# Patient Record
Sex: FEMALE | Race: BLACK OR AFRICAN AMERICAN | NOT HISPANIC OR LATINO | Employment: FULL TIME | RURAL
[De-identification: names, ages, dates, MRNs, and addresses within clinical notes are randomized per-mention and may not be internally consistent; named-entity substitution may affect disease eponyms.]

---
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
## 2022-08-02 ENCOUNTER — TELEPHONE (OUTPATIENT)
Dept: FAMILY MEDICINE CLINIC | Facility: CLINIC | Age: 22
End: 2022-08-02

## 2022-08-02 NOTE — TELEPHONE ENCOUNTER
Called and spoke with patient and her mother passed away. I made her an appointment for 08/09/2022. She verbalized understanding

## 2022-08-08 ENCOUNTER — TELEPHONE (OUTPATIENT)
Dept: FAMILY MEDICINE CLINIC | Facility: CLINIC | Age: 22
End: 2022-08-08

## 2022-08-08 NOTE — TELEPHONE ENCOUNTER
I have tried to call her to discuss her request for the rx olanzapine. I am showing that she is on the fluoxetine but the other was discontinued by Dr. Masters. If she wants back on the rx olanzapine she will need an appt.   There is no way to leave her a voice mail to call us back so I am hoping she will see that I have called and return my call. TF

## 2022-08-08 NOTE — TELEPHONE ENCOUNTER
PT CAME IN THINKING HER APPOINTMENT WAS TODAY RATHER THAN TOMORROW. SHE WANTED TO LEAVE A MESSAGE, SHE IS REQUESTING TO GO BACK ON OLANZAPINE AND FLUOXETINE.

## 2022-08-09 ENCOUNTER — OFFICE VISIT (OUTPATIENT)
Dept: FAMILY MEDICINE CLINIC | Facility: CLINIC | Age: 22
End: 2022-08-09

## 2022-08-09 VITALS
DIASTOLIC BLOOD PRESSURE: 77 MMHG | HEART RATE: 74 BPM | HEIGHT: 68 IN | RESPIRATION RATE: 12 BRPM | OXYGEN SATURATION: 98 % | SYSTOLIC BLOOD PRESSURE: 109 MMHG | BODY MASS INDEX: 27.34 KG/M2 | WEIGHT: 180.4 LBS | TEMPERATURE: 98.4 F

## 2022-08-09 DIAGNOSIS — F31.81 BIPOLAR 2 DISORDER, MAJOR DEPRESSIVE EPISODE: ICD-10-CM

## 2022-08-09 DIAGNOSIS — F33.1 MAJOR DEPRESSIVE DISORDER, RECURRENT EPISODE, MODERATE: Primary | ICD-10-CM

## 2022-08-09 PROBLEM — Z30.017 ENCOUNTER FOR INITIAL PRESCRIPTION OF IMPLANTABLE SUBDERMAL CONTRACEPTIVE: Status: ACTIVE | Noted: 2021-08-04

## 2022-08-09 PROBLEM — F41.1 GENERALIZED ANXIETY DISORDER: Status: ACTIVE | Noted: 2022-08-09

## 2022-08-09 PROBLEM — K51.90 ULCERATIVE COLITIS (HCC): Status: ACTIVE | Noted: 2020-09-23

## 2022-08-09 PROBLEM — Z30.431 IUD CHECK UP: Status: ACTIVE | Noted: 2021-08-04

## 2022-08-09 PROCEDURE — 99213 OFFICE O/P EST LOW 20 MIN: CPT | Performed by: INTERNAL MEDICINE

## 2022-08-09 RX ORDER — ARIPIPRAZOLE 5 MG/1
5 TABLET ORAL DAILY
Qty: 30 TABLET | Refills: 2 | Status: SHIPPED | OUTPATIENT
Start: 2022-08-09 | End: 2022-09-13

## 2022-08-09 RX ORDER — FLUOXETINE 20 MG/1
20 TABLET, FILM COATED ORAL DAILY
COMMUNITY
Start: 2022-06-21

## 2022-08-09 NOTE — PROGRESS NOTES
Follow Up Office Visit      Date: 2022   Patient Name: Cheyanne Vernon  : 2000   MRN: [id number]     Chief Complaint:    Chief Complaint   Patient presents with   • Anxiety   • Depression       History of Present Illness: Cheyanne Vernon is a 22 y.o. female who is here today for review of her moods.  History of some anxiety depression and bipolar features in the past.  She had been on an atypical antipsychotic but stopped this continue only fluoxetine alone.  She is noted for last month having some more depressive symptoms with decreased energy, difficulty with sleep, feeling down.  She also has thought at times about suicide but is never really come up with a plan and is not currently suicidal at this point.  She does also have a lot of irritability and anger.  The symptoms have been exacerbated by the sudden death of her of her mother about a week ago.  She was drinking alcohol excessively last week but realizes that this is not appropriate and is now stopped.  Normally she might drink alcohol twice a week though still excessively.  No illicit drug use.  Does have ulcerative colitis which is currently in remission having had a good bill of health at her checkup with GI earlier today..    Subjective      Review of Systems:   Review of Systems    I have reviewed the patients family history, social history, past medical history, past surgical history and have updated it as appropriate.     Medications:     Current Outpatient Medications:   •  ARIPiprazole (Abilify) 5 MG tablet, Take 1 tablet by mouth Daily., Disp: 30 tablet, Rfl: 2  •  FLUoxetine (PROzac) 20 MG tablet, Take 20 mg by mouth Daily., Disp: , Rfl:   •  mesalamine (APRISO) 0.375 g 24 hr capsule, Take 1,500 mg by mouth Daily., Disp: , Rfl:   •  Vedolizumab (ENTYVIO IV), Infuse  into a venous catheter., Disp: , Rfl:   •  vitamin C (ASCORBIC ACID) 250 MG tablet, Take 250 mg by mouth Daily., Disp: , Rfl:     Allergies:   No Known  "Allergies    Objective     Physical Exam: Please see above  Vital Signs:   Vitals:    08/09/22 0858   BP: 109/77   Pulse: 74   Resp: 12   Temp: 98.4 °F (36.9 °C)   SpO2: 98%   Weight: 81.8 kg (180 lb 6.4 oz)   Height: 171.5 cm (67.5\")     Body mass index is 27.84 kg/m².         Physical Exam  General: Pleasant minimally overweight healthy-appearing young black female who is in no acute distress.  Affect down but insight still intact.  Fair to good eye contact.  Neck supple with no masses  Lungs clear with no tachypnea or cough  Cardiac regular rate rhythm with no murmurs gallops or rubs  Neurological exam grossly normal  Procedures    Results:   Labs:   No results found for: HGBA1C, CMP, CBCDIFFPANEL, CREAT, TSH     Imaging:   No valid procedures specified.     Assessment / Plan      Assessment/Plan:   Diagnoses and all orders for this visit:    1. Major depressive disorder, recurrent episode, moderate (HCC) (Primary)  Present for the last month on her fluoxetine 20 mg daily, exacerbated by the unexpected death of her mother last week.  She does have also some bipolar type features as well which are exacerbated, previously having been on an atypical antipsychotic.  We will initiate Abilify 5 mg nightly continue her fluoxetine.  I also recommended counseling which she declines at this stage.  She is not suicidal, and promises she would seek help if this became an issue.  We will reassess clinical response in 1 months.  2. Bipolar 2 disorder, major depressive episode (HCC)  Treat as above.  Other orders  -     ARIPiprazole (Abilify) 5 MG tablet; Take 1 tablet by mouth Daily.  Dispense: 30 tablet; Refill: 2        Follow Up:   Return in about 1 month (around 9/9/2022) for Recheck.      At [facility], we believe that sharing information builds trust and better relationships. You are receiving this note because you recently visited [facility]. It is possible you will see health information before a provider has " talked with you about it. This kind of information can be easy to misunderstand. To help you fully understand what it means for your health, we urge you to discuss this note with your provider.    Guerrero Elliott MD  [facility] [place]

## 2022-09-13 ENCOUNTER — OFFICE VISIT (OUTPATIENT)
Dept: FAMILY MEDICINE CLINIC | Facility: CLINIC | Age: 22
End: 2022-09-13

## 2022-09-13 VITALS
OXYGEN SATURATION: 100 % | DIASTOLIC BLOOD PRESSURE: 76 MMHG | BODY MASS INDEX: 27.34 KG/M2 | SYSTOLIC BLOOD PRESSURE: 118 MMHG | HEART RATE: 65 BPM | HEIGHT: 68 IN | TEMPERATURE: 98.8 F | WEIGHT: 180.4 LBS | RESPIRATION RATE: 12 BRPM

## 2022-09-13 DIAGNOSIS — F33.1 MAJOR DEPRESSIVE DISORDER, RECURRENT EPISODE, MODERATE: Primary | ICD-10-CM

## 2022-09-13 DIAGNOSIS — Z23 NEED FOR PROPHYLACTIC VACCINATION AND INOCULATION AGAINST INFLUENZA: ICD-10-CM

## 2022-09-13 PROCEDURE — 90471 IMMUNIZATION ADMIN: CPT | Performed by: INTERNAL MEDICINE

## 2022-09-13 PROCEDURE — 90688 IIV4 VACCINE SPLT 0.5 ML IM: CPT | Performed by: INTERNAL MEDICINE

## 2022-09-13 PROCEDURE — 99213 OFFICE O/P EST LOW 20 MIN: CPT | Performed by: INTERNAL MEDICINE

## 2022-09-13 RX ORDER — ARIPIPRAZOLE 5 MG/1
TABLET ORAL
Qty: 45 TABLET | Refills: 1 | Status: SHIPPED | OUTPATIENT
Start: 2022-09-13

## 2022-09-13 NOTE — PROGRESS NOTES
"    Follow Up Office Visit      Date: 2022   Patient Name: Cheyanne Vernon  : 2000   MRN: [id number]     Chief Complaint:    Chief Complaint   Patient presents with   • Follow-up       History of Present Illness: Cheyanne Vernon is a 22 y.o. female who is here today for follow-up of her bipolar disorder with some depression, having been started on Abilify 5 mg nightly 1 month ago continue her fluoxetine 20 mg daily.  She does feel she still having some irritability and anger, having some fatigue, but not frankly depressed or anxious at this time.  Overall may be a little bit better from last visit but not markedly so.  No other acute problems at this time.  Interested in a flu vaccine..    Subjective      Review of Systems:   Review of Systems    I have reviewed the patients family history, social history, past medical history, past surgical history and have updated it as appropriate.     Medications:     Current Outpatient Medications:   •  FLUoxetine (PROzac) 20 MG tablet, Take 20 mg by mouth Daily., Disp: , Rfl:   •  mesalamine (APRISO) 0.375 g 24 hr capsule, Take 1,500 mg by mouth Daily., Disp: , Rfl:   •  Vedolizumab (ENTYVIO IV), Infuse  into a venous catheter., Disp: , Rfl:   •  vitamin C (ASCORBIC ACID) 250 MG tablet, Take 250 mg by mouth Daily., Disp: , Rfl:   •  ARIPiprazole (Abilify) 5 MG tablet, 1.5 tablets at bedtime, Disp: 45 tablet, Rfl: 1    Allergies:   No Known Allergies    Objective     Physical Exam: Please see above  Vital Signs:   Vitals:    22 0853   BP: 118/76   BP Location: Left arm   Patient Position: Sitting   Cuff Size: Adult   Pulse: 65   Resp: 12   Temp: 98.8 °F (37.1 °C)   TempSrc: Temporal   SpO2: 100%   Weight: 81.8 kg (180 lb 6.4 oz)   Height: 171.5 cm (67.5\")     Body mass index is 27.84 kg/m².  BMI is >= 25 and <30. (Overweight) The following options were offered after discussion;: weight loss educational material (shared in after visit summary), " exercise counseling/recommendations and nutrition counseling/recommendations       Physical Exam  General: Pleasant healthy-appearing 22-year-old female in no acute distress.  Affect is appropriate.  Smiling.  Well-groomed.  Insight intact.  Does not appear to be irritable.  Lungs clear  Cardiac regular rate rhythm with no murmurs gallops or rubs  Neurological exam grossly normal  Procedures    Results:   Labs:   No results found for: HGBA1C, CMP, CBCDIFFPANEL, CREAT, TSH     POCT Results (if applicable):   Results for orders placed or performed in visit on 08/03/22    COLONOSCOPY   Result Value Ref Range     Colonoscopy SEE REPORT         Imaging:   No valid procedures specified.   Mood disorder questionnaire for bipolar disorder +3 of 13 items, technically normal study  DARRICK-7 screening for anxiety score 3, normal  PHQ-9 screen for depression score of 9 consistent with mild dysthymia    Assessment / Plan      Assessment/Plan:   Diagnoses and all orders for this visit:    1. Major depressive disorder, recurrent episode, moderate (HCC) (Primary)  -     ARIPiprazole (Abilify) 5 MG tablet; 1.5 tablets at bedtime  Dispense: 45 tablet; Refill: 1  Overall having a very minimal improvement in her symptoms on low-dose Abilify 5 mg nightly.  Increase to 1.5 tablets or 7.5 mg nightly continuing fluoxetine 20 mg daily.  Side effect profile potential discussed.  Reassess clinically in 1 month and make further recommendations  2. Need for prophylactic vaccination and inoculation against influenza  -     Fluzone Split Quad (Multi-dose)  Influenza vaccine given today.  Strongly advise she obtain COVID-19 vaccine series given high risk on immunosuppressants from her Crohn's disease.      Follow Up:   Return in about 1 month (around 10/13/2022) for Recheck.      At [facility], we believe that sharing information builds trust and better relationships. You are receiving this note because you recently visited [facility]. It  is possible you will see health information before a provider has talked with you about it. This kind of information can be easy to misunderstand. To help you fully understand what it means for your health, we urge you to discuss this note with your provider.    Guerrero Elliott MD  [facility] [place]

## 2022-09-24 ENCOUNTER — HOSPITAL ENCOUNTER (EMERGENCY)
Facility: HOSPITAL | Age: 22
Discharge: HOME | End: 2022-09-24
Attending: EMERGENCY MEDICINE | Admitting: EMERGENCY MEDICINE

## 2022-09-24 VITALS
WEIGHT: 180 LBS | HEART RATE: 75 BPM | OXYGEN SATURATION: 95 % | TEMPERATURE: 98.2 F | DIASTOLIC BLOOD PRESSURE: 75 MMHG | HEIGHT: 68 IN | BODY MASS INDEX: 27.28 KG/M2 | SYSTOLIC BLOOD PRESSURE: 106 MMHG | RESPIRATION RATE: 15 BRPM

## 2022-09-24 DIAGNOSIS — Z20.2 EXPOSURE TO STD: ICD-10-CM

## 2022-09-24 DIAGNOSIS — D72.829 LEUKOCYTOSIS, UNSPECIFIED TYPE: ICD-10-CM

## 2022-09-24 DIAGNOSIS — N92.1 MENORRHAGIA WITH IRREGULAR CYCLE: Primary | ICD-10-CM

## 2022-09-24 LAB
ALBUMIN SERPL-MCNC: 3.7 G/DL (ref 3.5–5.2)
ALBUMIN/GLOB SERPL: 1.3 G/DL
ALP SERPL-CCNC: 68 U/L (ref 39–117)
ALT SERPL W P-5'-P-CCNC: 15 U/L (ref 1–33)
ANION GAP SERPL CALCULATED.3IONS-SCNC: 10 MMOL/L (ref 5–15)
AST SERPL-CCNC: 15 U/L (ref 1–32)
B-HCG UR QL: NEGATIVE
BACTERIA UR QL AUTO: ABNORMAL /HPF
BASOPHILS # BLD AUTO: 0.04 10*3/MM3 (ref 0–0.2)
BASOPHILS NFR BLD AUTO: 0.2 % (ref 0–1.5)
BILIRUB SERPL-MCNC: 0.2 MG/DL (ref 0–1.2)
BILIRUB UR QL STRIP: NEGATIVE
BUN SERPL-MCNC: 11 MG/DL (ref 6–20)
BUN/CREAT SERPL: 16.7 (ref 7–25)
CALCIUM SPEC-SCNC: 9 MG/DL (ref 8.6–10.5)
CHLORIDE SERPL-SCNC: 108 MMOL/L (ref 98–107)
CLARITY UR: CLEAR
CO2 SERPL-SCNC: 22 MMOL/L (ref 22–29)
COLOR UR: YELLOW
CREAT SERPL-MCNC: 0.66 MG/DL (ref 0.57–1)
DEPRECATED RDW RBC AUTO: 46.1 FL (ref 37–54)
EGFRCR SERPLBLD CKD-EPI 2021: 129 ML/MIN/1.73
EOSINOPHIL # BLD AUTO: 0.35 10*3/MM3 (ref 0–0.4)
EOSINOPHIL NFR BLD AUTO: 2 % (ref 0.3–6.2)
ERYTHROCYTE [DISTWIDTH] IN BLOOD BY AUTOMATED COUNT: 13.5 % (ref 12.3–15.4)
EXPIRATION DATE: NORMAL
GLOBULIN UR ELPH-MCNC: 2.8 GM/DL
GLUCOSE SERPL-MCNC: 94 MG/DL (ref 65–99)
GLUCOSE UR STRIP-MCNC: NEGATIVE MG/DL
HCT VFR BLD AUTO: 36.3 % (ref 34–46.6)
HGB BLD-MCNC: 11.9 G/DL (ref 12–15.9)
HGB UR QL STRIP.AUTO: ABNORMAL
HYALINE CASTS UR QL AUTO: ABNORMAL /LPF
IMM GRANULOCYTES # BLD AUTO: 0.06 10*3/MM3 (ref 0–0.05)
IMM GRANULOCYTES NFR BLD AUTO: 0.3 % (ref 0–0.5)
INTERNAL NEGATIVE CONTROL: NEGATIVE
INTERNAL POSITIVE CONTROL: POSITIVE
KETONES UR QL STRIP: NEGATIVE
LEUKOCYTE ESTERASE UR QL STRIP.AUTO: ABNORMAL
LYMPHOCYTES # BLD AUTO: 3.91 10*3/MM3 (ref 0.7–3.1)
LYMPHOCYTES NFR BLD AUTO: 22.2 % (ref 19.6–45.3)
Lab: NORMAL
MCH RBC QN AUTO: 30.1 PG (ref 26.6–33)
MCHC RBC AUTO-ENTMCNC: 32.8 G/DL (ref 31.5–35.7)
MCV RBC AUTO: 91.9 FL (ref 79–97)
MONOCYTES # BLD AUTO: 1.09 10*3/MM3 (ref 0.1–0.9)
MONOCYTES NFR BLD AUTO: 6.2 % (ref 5–12)
NEUTROPHILS NFR BLD AUTO: 12.16 10*3/MM3 (ref 1.7–7)
NEUTROPHILS NFR BLD AUTO: 69.1 % (ref 42.7–76)
NITRITE UR QL STRIP: NEGATIVE
NRBC BLD AUTO-RTO: 0 /100 WBC (ref 0–0.2)
PH UR STRIP.AUTO: 6.5 [PH] (ref 5–8)
PLATELET # BLD AUTO: 292 10*3/MM3 (ref 140–450)
PMV BLD AUTO: 10.2 FL (ref 6–12)
POTASSIUM SERPL-SCNC: 4.2 MMOL/L (ref 3.5–5.2)
PROT SERPL-MCNC: 6.5 G/DL (ref 6–8.5)
PROT UR QL STRIP: NEGATIVE
RBC # BLD AUTO: 3.95 10*6/MM3 (ref 3.77–5.28)
RBC # UR STRIP: ABNORMAL /HPF
REF LAB TEST METHOD: ABNORMAL
SODIUM SERPL-SCNC: 140 MMOL/L (ref 136–145)
SP GR UR STRIP: 1.02 (ref 1–1.03)
SQUAMOUS #/AREA URNS HPF: ABNORMAL /HPF
UROBILINOGEN UR QL STRIP: ABNORMAL
WBC # UR STRIP: ABNORMAL /HPF
WBC NRBC COR # BLD: 17.61 10*3/MM3 (ref 3.4–10.8)

## 2022-09-24 PROCEDURE — 86592 SYPHILIS TEST NON-TREP QUAL: CPT | Performed by: PHYSICIAN ASSISTANT

## 2022-09-24 PROCEDURE — 81025 URINE PREGNANCY TEST: CPT | Performed by: PHYSICIAN ASSISTANT

## 2022-09-24 PROCEDURE — 25010000002 CEFTRIAXONE PER 250 MG: Performed by: PHYSICIAN ASSISTANT

## 2022-09-24 PROCEDURE — 81001 URINALYSIS AUTO W/SCOPE: CPT | Performed by: PHYSICIAN ASSISTANT

## 2022-09-24 PROCEDURE — 85025 COMPLETE CBC W/AUTO DIFF WBC: CPT | Performed by: PHYSICIAN ASSISTANT

## 2022-09-24 PROCEDURE — 87591 N.GONORRHOEAE DNA AMP PROB: CPT | Performed by: PHYSICIAN ASSISTANT

## 2022-09-24 PROCEDURE — 80053 COMPREHEN METABOLIC PANEL: CPT | Performed by: PHYSICIAN ASSISTANT

## 2022-09-24 PROCEDURE — 87491 CHLMYD TRACH DNA AMP PROBE: CPT | Performed by: PHYSICIAN ASSISTANT

## 2022-09-24 PROCEDURE — 99283 EMERGENCY DEPT VISIT LOW MDM: CPT

## 2022-09-24 PROCEDURE — 96372 THER/PROPH/DIAG INJ SC/IM: CPT

## 2022-09-24 PROCEDURE — 25010000002 KETOROLAC TROMETHAMINE PER 15 MG: Performed by: PHYSICIAN ASSISTANT

## 2022-09-24 PROCEDURE — 96374 THER/PROPH/DIAG INJ IV PUSH: CPT

## 2022-09-24 RX ORDER — KETOROLAC TROMETHAMINE 15 MG/ML
15 INJECTION, SOLUTION INTRAMUSCULAR; INTRAVENOUS ONCE
Status: COMPLETED | OUTPATIENT
Start: 2022-09-24 | End: 2022-09-24

## 2022-09-24 RX ORDER — DOXYCYCLINE 100 MG/1
100 CAPSULE ORAL 2 TIMES DAILY
Qty: 14 CAPSULE | Refills: 0 | Status: SHIPPED | OUTPATIENT
Start: 2022-09-24

## 2022-09-24 RX ORDER — DOXYCYCLINE 100 MG/1
100 CAPSULE ORAL ONCE
Status: COMPLETED | OUTPATIENT
Start: 2022-09-24 | End: 2022-09-24

## 2022-09-24 RX ORDER — SODIUM CHLORIDE 0.9 % (FLUSH) 0.9 %
10 SYRINGE (ML) INJECTION AS NEEDED
Status: DISCONTINUED | OUTPATIENT
Start: 2022-09-24 | End: 2022-09-24 | Stop reason: HOSPADM

## 2022-09-24 RX ADMIN — LIDOCAINE HYDROCHLORIDE 490 MG: 10 INJECTION, SOLUTION EPIDURAL; INFILTRATION; INTRACAUDAL; PERINEURAL at 19:46

## 2022-09-24 RX ADMIN — KETOROLAC TROMETHAMINE 15 MG: 15 INJECTION, SOLUTION INTRAMUSCULAR; INTRAVENOUS at 19:20

## 2022-09-24 RX ADMIN — DOXYCYCLINE 100 MG: 100 CAPSULE ORAL at 19:47

## 2022-09-24 NOTE — ED PROVIDER NOTES
Subjective   History of Present Illness  Ms. Vernon is a 22 yr old female who presents to the ED with complaints of heavy vaginal bleeding and cramping that began Sept 15th. The pt states she is going through about 1 pad per hour.  She has not been seen for this elsewhere.  No prior history of abnormal vaginal bleeding.  She has a history of iron deficiency anemia.  She has a hx of ulcerative colitis.  No rectal bleeding.  She also voices concern for possible STI exposure.  Her current menstrual period is 2 wks early.  She has Nexplanon for contraception.          Review of Systems   Constitutional: Negative for chills and fever.   HENT: Negative for sore throat.    Respiratory: Negative for cough and shortness of breath.    Cardiovascular: Negative for chest pain.   Gastrointestinal: Negative for abdominal pain, diarrhea, nausea and vomiting.   Genitourinary: Positive for pelvic pain and vaginal bleeding.   Musculoskeletal: Negative for back pain.   Skin: Negative for rash.   Neurological: Negative for light-headedness.   Hematological: Negative.    Psychiatric/Behavioral: Negative.        Past Medical History:   Diagnosis Date   • Acute vaginitis    • Bipolar II disorder ([facility])    • Dysthymic disorder    • Generalized anxiety disorder    • Internal hemorrhoids     Internal hemorrhoids with  hematochezia noted  June 2017   • Lower abdominal pain    • Major depressive disorder, recurrent, mild ([facility])    • Nausea    • Other chlamydial infection of lower genitourinary tract    • Other iron deficiency anemia    • Pyuria    • Ulcerative colitis ([facility])    • UTI (urinary tract infection)        No Known Allergies    Past Surgical History:   Procedure Laterality Date   • COLONOSCOPY      Colonoscopy 6/8/2018 indicative of severe ulcerative colitis, most recent colonoscopy 7/24/2019 with distal rectal colitis, pathology confirming chronic active colitis   • WISDOM TOOTH EXTRACTION         Family History   Problem Relation Age  of Onset   • Diabetes Mother        Social History     Socioeconomic History   • Marital status: Single   Tobacco Use   • Smoking status: Never Smoker   • Smokeless tobacco: Never Used   Vaping Use   • Vaping Use: Never used   Substance and Sexual Activity   • Alcohol use: Never   • Drug use: Never   • Sexual activity: Yes     Partners: Male     Birth control/protection: OCP, Condom           Objective   Physical Exam  Constitutional:       General: She is not in acute distress.     Appearance: Normal appearance.   HENT:      Head: Normocephalic.      Nose: Nose normal.      Mouth/Throat:      Mouth: Mucous membranes are moist.   Eyes:      General: No scleral icterus.     Conjunctiva/sclera: Conjunctivae normal.      Pupils: Pupils are equal, round, and reactive to light.   Cardiovascular:      Rate and Rhythm: Normal rate and regular rhythm.      Pulses: Normal pulses.   Pulmonary:      Effort: Pulmonary effort is normal.      Breath sounds: Normal breath sounds.   Abdominal:      General: Bowel sounds are normal.      Tenderness: There is no abdominal tenderness. There is no guarding.   Musculoskeletal:         General: Normal range of motion.      Cervical back: Normal range of motion.   Skin:     General: Skin is warm and dry.   Neurological:      General: No focal deficit present.      Mental Status: She is alert and oriented to person, place, and time.   Psychiatric:         Mood and Affect: Mood normal.         Procedures           ED Course  ED Course as of 09/24/22 1944   Sat Sep 24, 2022   1848 HCG, Urine QL: Negative [RS]      ED Course User Index  [RS] Petros Fernández MD      Pt appears in no distress.  Her H&H is 11.9/36.3.  White count is 17.61.  UA shows 6-12 WBC with no bacteria seen.  No abdominal pain on palpation.  I spoke with her about her workup.  I will go ahead and give her a dose of rocephin and Doxy here and d/c home on Doxy and have f/u with her gynecologist or return if  worse.                                           MDM    Final diagnoses:   Menorrhagia with irregular cycle   Exposure to STD   Leukocytosis, unspecified type       ED Disposition  ED Disposition     ED Disposition   Discharge    Condition   Stable    Comment   --               Follow up with your gynecologist (call Monday for appointment).  Return if worse.             Medication List      New Prescriptions    doxycycline 100 MG capsule  Commonly known as: MONODOX  Take 1 capsule by mouth 2 (Two) Times a Day.           Where to Get Your Medications      These medications were sent to [facility] [address] [place] [address] - [facility] - [phone] [place] [phone] [facility] [facility] [address]    Phone: [phone]   · doxycycline 100 MG capsule          Danny De Leon, PA  09/24/22 194

## 2022-09-25 LAB — RPR SER QL: NORMAL

## 2022-09-27 LAB
C TRACH RRNA SPEC QL NAA+PROBE: NEGATIVE
N GONORRHOEA RRNA SPEC QL NAA+PROBE: NEGATIVE

## 2022-10-06 RX ORDER — MESALAMINE 0.38 G/1
1500 CAPSULE, EXTENDED RELEASE ORAL DAILY
Qty: 30 CAPSULE | Refills: 0 | Status: SHIPPED | OUTPATIENT
Start: 2022-10-06

## 2022-10-06 NOTE — TELEPHONE ENCOUNTER
Caller: RudiearnestCheyanne    Relationship: Self    Best call back number: [phone]    Requested Prescriptions:   Requested Prescriptions     Pending Prescriptions Disp Refills   • mesalamine (APRISO) 0.375 g 24 hr capsule       Sig: Take 4 capsules by mouth Daily.        Pharmacy where request should be sent: [facility] [address] [place] [address] [phone] [facility] [phone] FX     Additional details provided by patient: PATIENT STATES HER MEDICATION SHOULD HAVE BEEN SENT IN FOR A HIGHER DOSE TO 7.5MG     Does the patient have less than a 3 day supply:  [x] Yes  [] No    Jeremias Winn Rep   10/06/22 14:45 EDT

## 2023-05-26 ENCOUNTER — OFFICE VISIT (OUTPATIENT)
Dept: FAMILY MEDICINE CLINIC | Facility: CLINIC | Age: 23
End: 2023-05-26
Payer: COMMERCIAL

## 2023-05-26 VITALS
SYSTOLIC BLOOD PRESSURE: 121 MMHG | DIASTOLIC BLOOD PRESSURE: 82 MMHG | BODY MASS INDEX: 25.55 KG/M2 | HEART RATE: 96 BPM | WEIGHT: 168.6 LBS | TEMPERATURE: 98.3 F | OXYGEN SATURATION: 99 % | HEIGHT: 68 IN

## 2023-05-26 DIAGNOSIS — F41.1 GENERALIZED ANXIETY DISORDER: ICD-10-CM

## 2023-05-26 DIAGNOSIS — K51.919 ULCERATIVE COLITIS WITH COMPLICATION, UNSPECIFIED LOCATION: ICD-10-CM

## 2023-05-26 DIAGNOSIS — F31.81 BIPOLAR 2 DISORDER, MAJOR DEPRESSIVE EPISODE: Primary | ICD-10-CM

## 2023-05-26 PROCEDURE — 1159F MED LIST DOCD IN RCRD: CPT | Performed by: INTERNAL MEDICINE

## 2023-05-26 PROCEDURE — 99214 OFFICE O/P EST MOD 30 MIN: CPT | Performed by: INTERNAL MEDICINE

## 2023-05-26 PROCEDURE — 1160F RVW MEDS BY RX/DR IN RCRD: CPT | Performed by: INTERNAL MEDICINE

## 2023-05-26 RX ORDER — AZATHIOPRINE 50 MG/1
50 TABLET ORAL 3 TIMES DAILY
COMMUNITY
Start: 2023-04-13

## 2023-05-26 RX ORDER — ARIPIPRAZOLE 5 MG/1
5 TABLET ORAL NIGHTLY
Qty: 30 TABLET | Refills: 1 | Status: SHIPPED | OUTPATIENT
Start: 2023-05-26

## 2023-05-26 RX ORDER — FLUOXETINE HYDROCHLORIDE 20 MG/1
20 CAPSULE ORAL DAILY
Qty: 30 CAPSULE | Refills: 1 | Status: SHIPPED | OUTPATIENT
Start: 2023-05-26
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
## 2023-07-07 PROBLEM — D64.9 ANEMIA: Status: ACTIVE | Noted: 2023-07-07

## 2023-07-07 PROBLEM — N92.0 MENORRHAGIA WITH REGULAR CYCLE: Status: ACTIVE | Noted: 2023-07-07

## 2023-07-12 PROBLEM — R00.2 PALPITATIONS: Status: ACTIVE | Noted: 2023-07-12

## 2023-07-12 PROBLEM — R10.13 EPIGASTRIC PAIN: Status: ACTIVE | Noted: 2023-07-12

## 2023-09-05 ENCOUNTER — TELEPHONE (OUTPATIENT)
Dept: FAMILY MEDICINE CLINIC | Facility: CLINIC | Age: 23
End: 2023-09-05
Payer: COMMERCIAL

## 2023-09-08 RX ORDER — SIMETHICONE 80 MG
80 TABLET,CHEWABLE ORAL EVERY 6 HOURS PRN
COMMUNITY
Start: 2023-09-06 | End: 2023-09-16

## 2023-09-08 RX ORDER — OXYCODONE HYDROCHLORIDE 5 MG/1
5 TABLET ORAL EVERY 4 HOURS PRN
COMMUNITY
Start: 2023-09-06 | End: 2023-09-09

## 2023-09-08 RX ORDER — METRONIDAZOLE 500 MG/1
500 TABLET ORAL 2 TIMES DAILY
COMMUNITY
Start: 2023-08-09 | End: 2023-09-11

## 2023-09-08 RX ORDER — ACYCLOVIR 400 MG/1
200 TABLET ORAL 2 TIMES DAILY
COMMUNITY
Start: 2023-08-09 | End: 2023-09-11

## 2023-09-08 RX ORDER — PREDNISONE 10 MG/1
10 TABLET ORAL DAILY
COMMUNITY
Start: 2023-09-06 | End: 2023-10-06

## 2023-09-11 ENCOUNTER — OFFICE VISIT (OUTPATIENT)
Dept: FAMILY MEDICINE CLINIC | Facility: CLINIC | Age: 23
End: 2023-09-11
Payer: COMMERCIAL

## 2023-09-11 VITALS
BODY MASS INDEX: 25.22 KG/M2 | HEART RATE: 76 BPM | WEIGHT: 166.4 LBS | DIASTOLIC BLOOD PRESSURE: 74 MMHG | SYSTOLIC BLOOD PRESSURE: 122 MMHG | HEIGHT: 68 IN | OXYGEN SATURATION: 98 % | TEMPERATURE: 98 F

## 2023-09-11 DIAGNOSIS — F41.1 GENERALIZED ANXIETY DISORDER: ICD-10-CM

## 2023-09-11 DIAGNOSIS — K51.319 ULCERATIVE RECTOSIGMOIDITIS WITH COMPLICATION: Primary | ICD-10-CM

## 2023-09-11 DIAGNOSIS — F31.81 BIPOLAR 2 DISORDER: ICD-10-CM

## 2023-09-11 DIAGNOSIS — D50.0 IRON DEFICIENCY ANEMIA DUE TO CHRONIC BLOOD LOSS: ICD-10-CM

## 2023-09-11 LAB
EXPIRATION DATE: ABNORMAL
HGB BLDA-MCNC: 8.7 G/DL (ref 12–17)
Lab: ABNORMAL

## 2023-09-11 PROCEDURE — 99214 OFFICE O/P EST MOD 30 MIN: CPT | Performed by: INTERNAL MEDICINE

## 2023-09-11 PROCEDURE — 1159F MED LIST DOCD IN RCRD: CPT | Performed by: INTERNAL MEDICINE

## 2023-09-11 PROCEDURE — 1160F RVW MEDS BY RX/DR IN RCRD: CPT | Performed by: INTERNAL MEDICINE

## 2023-09-11 PROCEDURE — 85018 HEMOGLOBIN: CPT | Performed by: INTERNAL MEDICINE

## 2023-09-11 NOTE — PROGRESS NOTES
Follow Up Office Visit      Date: 2023   Patient Name: Cheyanne Vernon  : 2000   MRN: [id number]     Chief Complaint:    Chief Complaint   Patient presents with    Follow-up       History of Present Illness: Cheyanne Vernon is a 23 y.o. female who is here today for follow-up of hospitalization at [facility] [place], [place] from 2023 through 2023 secondary to an exacerbation of her rectosigmoid ulcerative colitis, with associated rectal bleeding and secondary anemia, colonoscopy revealing severe generalized edematous erythematous fissured and ulcerated mucosa with erosion and loss of folds 50 cm from the anal verge consistent with ulcerative colitis, less severe in the rectum noting scope only advanced to the descending colon given severity of the inflammation.  Patient was reportedly given IV Remicade, IV steroids, continued on her Imuran and transitioned to prednisone slow taper as an outpatient, currently at 40 mg daily down to ultimately 10 mg daily over the last month.  She apparently was to discontinue her mesalamine, and continue her Imuran 50 mg twice daily.  Indicates she is generally feeling well at this time, no abdominal pain, no hematochezia with her stool frequency down to 3 times daily rather than by her account 20+ times daily.  Energy better.  Did have in-hospital testing including screening for hepatitis B, C, and quantitative TB Gold study all normal.  She is scheduled to follow-up with colorectal surgery, Dr. Pantoja in several weeks.  Discharge hemoglobin 8.9.  Did not require transfusion.  Currently taking iron 325 mg q. OD rather than daily.  Unrelated, does have bipolar disorder with depression and anxiety, indicating she is doing well with her moods taking Prozac 20 mg daily and Abilify 5 mg nightly..    Records from [facility] [place] [place] hospitalization from  through 2023 viewed by me    Subjective      Review of Systems:   Review of Systems    I have  "reviewed the patients family history, social history, past medical history, past surgical history and have updated it as appropriate.     Medications:     Current Outpatient Medications:     ARIPiprazole (Abilify) 5 MG tablet, Take 1 tablet by mouth Every Night., Disp: 30 tablet, Rfl: 1    azaTHIOprine (IMURAN) 50 MG tablet, Take 1 tablet by mouth 3 (Three) Times a Day., Disp: , Rfl:     ferrous sulfate 325 (65 FE) MG tablet, Take 1 tablet by mouth Daily With Breakfast., Disp: 60 tablet, Rfl: 1    FLUoxetine (PROzac) 20 MG capsule, Take 1 capsule by mouth Daily., Disp: 30 capsule, Rfl: 1    mesalamine (APRISO) 0.375 g 24 hr capsule, Take 4 capsules by mouth Daily. (Patient taking differently: Take 4 capsules by mouth Daily. 4 capsules daily), Disp: 30 capsule, Rfl: 0    predniSONE (DELTASONE) 10 MG tablet, Take 1 tablet by mouth Daily. He has directed, Disp: , Rfl:     simethicone (MYLICON) 80 MG chewable tablet, Chew 1 tablet Every 6 (Six) Hours As Needed., Disp: , Rfl:     Allergies:   No Known Allergies    Objective     Physical Exam: Please see above  Vital Signs:   Vitals:    09/11/23 1621   BP: 122/74   BP Location: Left arm   Patient Position: Sitting   Cuff Size: Adult   Pulse: 76   Temp: 98 °F (36.7 °C)   TempSrc: Temporal   SpO2: 98%   Weight: 75.5 kg (166 lb 6.4 oz)   Height: 172.7 cm (68\")     Body mass index is 25.3 kg/m².          Physical Exam  General: Pleasant healthy 23-year-old female who is in no acute distress.  Appears to be in good spirits.  Oral pharynx with mucosa, moist membranes, neck supple with no masses or tenderness  Lungs are clear with no wheeze tachypnea or cough  Cardiac regular rate rhythm with no murmurs gallops or rubs  Abdomen with mild lower abdominal discomfort to deep palpation, no rebound or guard, no organomegaly or masses, normal active bowel sounds  Neurological exam grossly normal  Procedures    Results:   Labs:   Hemoglobin A1C   Date Value Ref Range Status "   07/07/2023 5.1 4.8 - 5.6 % Final     Comment:              Prediabetes: 5.7 - 6.4           Diabetes: >6.4           Glycemic control for adults with diabetes: <7.0       TSH   Date Value Ref Range Status   07/07/2023 1.060 0.450 - 4.500 uIU/mL Final        POCT Results (if applicable):   Results for orders placed or performed in visit on 09/11/23   POC Hemoglobin    Specimen: Blood   Result Value Ref Range    Hemoglobin 8.7 (A) 12.0 - 17.0 g/dL    Lot Number [id number]     Expiration Date 12/12/2023        Assessment / Plan      Assessment/Plan:   Diagnoses and all orders for this visit:    1. Ulcerative rectosigmoiditis with complication (Primary)  -     POC Hemoglobin  Recent hospitalization from  on 8/29 through 9/6/2023 secondary to severe anemia related to exacerbation of her rectosigmoid ulcerative colitis, reportedly not having taken medication for several weeks.  She had significant anemia, though did not require transfusion, with a discharge hemoglobin of 8.9 on iron sulfate reportedly every other day per patient.  She was given IV Remicade, IV Solu-Medrol, and discharged on a slow prednisone taper currently having completed prednisone 40 mg daily for 5 days to taper slowly by 5 mg daily over the next month.  She continues on her Imuran 50 mg 3 times daily and reports she is to get outpatient infusions of Remicade.  She is no longer having abdominal pain, bloody diarrhea has stopped as well as stool frequency from multiple times daily down to 2 or 3 times daily.  Hemoglobin today still low at 8.7 but has not significantly declined.  I did advise to increase her iron sulfate 325 mg from q. OD after daily.  She follows up with  colorectal surgery in the next 2 to 3 weeks.  Anticipate she will have repeat CBC and specifically hemoglobin at that time.  2. Iron deficiency anemia due to chronic blood loss  -     POC Hemoglobin  Hemoglobin generally stable at 8.7 today, previous discharge hemoglobin 8.9.   Increasing iron sulfate from every other day up to daily.  3. Generalized anxiety disorder  Reports overall good control of her moods including anxiety on Prozac 20 mg daily and Abilify 5 mg nightly.  4. Bipolar 2 disorder  Reports doing well on her Prozac and Abilify as noted above.    Advised patient that she would benefit from influenza and COVID-19 vaccines when she is clinically stable and on baseline immunosuppression.    Follow Up:   Return in about 2 months (around 11/11/2023) for Recheck.      At [facility], we believe that sharing information builds trust and better relationships. You are receiving this note because you recently visited [facility]. It is possible you will see health information before a provider has talked with you about it. This kind of information can be easy to misunderstand. To help you fully understand what it means for your health, we urge you to discuss this note with your provider.    Guerrero Elliott MD  [facility]

## 2023-10-19 DIAGNOSIS — F31.81 BIPOLAR 2 DISORDER, MAJOR DEPRESSIVE EPISODE: ICD-10-CM

## 2023-10-19 DIAGNOSIS — F41.1 GENERALIZED ANXIETY DISORDER: ICD-10-CM

## 2023-10-20 RX ORDER — FLUOXETINE HYDROCHLORIDE 20 MG/1
20 CAPSULE ORAL DAILY
Qty: 30 CAPSULE | Refills: 1 | Status: SHIPPED | OUTPATIENT
Start: 2023-10-20

## 2023-10-20 RX ORDER — ARIPIPRAZOLE 5 MG/1
5 TABLET ORAL NIGHTLY
Qty: 30 TABLET | Refills: 1 | Status: SHIPPED | OUTPATIENT
Start: 2023-10-20

## 2023-11-02 ENCOUNTER — CLINICAL SUPPORT (OUTPATIENT)
Dept: FAMILY MEDICINE CLINIC | Facility: CLINIC | Age: 23
End: 2023-11-02
Payer: COMMERCIAL

## 2023-11-02 DIAGNOSIS — Z23 NEED FOR VACCINATION: Primary | ICD-10-CM

## 2023-11-02 NOTE — PROGRESS NOTES
Jessica has administered her flu shot in our office today. She has tolerated this well and has waited the 15 minutes with no reaction. TF

## 2023-11-30 ENCOUNTER — READMISSION MANAGEMENT (OUTPATIENT)
Dept: CALL CENTER | Facility: HOSPITAL | Age: 23
End: 2023-11-30
Payer: COMMERCIAL

## 2023-11-30 NOTE — OUTREACH NOTE
Prep Survey      Flowsheet Row Responses   [facility] facility patient discharged from? Non-BH   Is LACE score < 7 ? Non-BH Discharge   Eligibility [facility]   Date of Admission 11/29/23   Date of Discharge 11/30/23   Discharge Disposition Home or Self Care   Discharge diagnosis Ulcerative colitis with rectal bleeding   Does the patient have one of the following disease processes/diagnoses(primary or secondary)? Other   Prep survey completed? Yes            Shea ASCENCIO - Registered Nurse

## 2023-12-01 ENCOUNTER — TRANSITIONAL CARE MANAGEMENT TELEPHONE ENCOUNTER (OUTPATIENT)
Dept: CALL CENTER | Facility: HOSPITAL | Age: 23
End: 2023-12-01
Payer: COMMERCIAL

## 2023-12-01 NOTE — OUTREACH NOTE
Call Center TCM Note      Flowsheet Row Responses   [facility] patient discharged from? Non-  []   Does the patient have one of the following disease processes/diagnoses(primary or secondary)? Other   TCM attempt successful? Yes   Call start time 1331   Call end time 1333   Discharge diagnosis Ulcerative colitis with rectal bleeding   Does the patient have all medications ordered at discharge? Yes   Is the patient taking all medications as directed (includes completed medication regime)? Yes   Comments Scheduled hospital f/u appt with PCP for 12/8   Does the patient have an appointment with their PCP within 7-14 days of discharge? Yes   Has home health visited the patient within 72 hours of discharge? N/A   Psychosocial issues? No   Did the patient receive a copy of their discharge instructions? Yes   What is the patient's perception of their health status since discharge? Same   Is the patient/caregiver able to teach back signs and symptoms related to disease process for when to call PCP? Yes   Is the patient/caregiver able to teach back signs and symptoms related to disease process for when to call 911? Yes   Is the patient/caregiver able to teach back the hierarchy of who to call/visit for symptoms/problems? PCP, Specialist, Home health nurse, Urgent Care, ED, 911 Yes   TCM call completed? Yes   Wrap up additional comments Doing ok but still not feeling well, scheduled hospital f/u appt with PCP for 12/8.   Call end time 1333   Would this patient benefit from a Referral to Amb Social Work? No   Is the patient interested in additional calls from an ambulatory ? No            Smitha Arriola RN    12/1/2023, 13:33 EST

## 2023-12-13 ENCOUNTER — READMISSION MANAGEMENT (OUTPATIENT)
Dept: CALL CENTER | Facility: HOSPITAL | Age: 23
End: 2023-12-13
Payer: COMMERCIAL

## 2023-12-13 NOTE — OUTREACH NOTE
Prep Survey      Flowsheet Row Responses   [facility] facility patient discharged from? Non-BH   Is LACE score < 7 ? Non-BH Discharge   Eligibility [facility]   Date of Admission 12/01/23   Date of Discharge 12/12/23   Discharge Disposition Home or Self Care   Discharge diagnosis Ulcerative colitis with complication   Does the patient have one of the following disease processes/diagnoses(primary or secondary)? Other   Prep survey completed? Yes            Shea ASCENCIO - Registered Nurse

## 2023-12-14 ENCOUNTER — TRANSITIONAL CARE MANAGEMENT TELEPHONE ENCOUNTER (OUTPATIENT)
Dept: CALL CENTER | Facility: HOSPITAL | Age: 23
End: 2023-12-14
Payer: COMMERCIAL

## 2023-12-14 NOTE — OUTREACH NOTE
Call Center TCM Note      Flowsheet Row Responses   [facility] patient discharged from? Non-   Does the patient have one of the following disease processes/diagnoses(primary or secondary)? Other   TCM attempt successful? Yes   Call start time 1021   Call end time 1028   Discharge diagnosis Ulcerative colitis with complication   Person spoke with today (if not patient) and relationship pt   Meds reviewed with patient/caregiver? Yes   Is the patient having any side effects they believe may be caused by any medication additions or changes? No   Does the patient have all medications ordered at discharge? No   Prescription comments Pt to get samples of Rinvoq from GI clinic   Is the patient taking all medications as directed (includes completed medication regime)? N/A   Comments Needs GI fu appt (phone number given to pt),    Does the patient have an appointment with their PCP within 7-14 days of discharge? No appointments available   Nursing Interventions Routed TCM call to PCP office   Psychosocial issues? No   Did the patient receive a copy of their discharge instructions? Yes   Nursing interventions Reviewed instructions with patient   What is the patient's perception of their health status since discharge? Improving   Is the patient/caregiver able to teach back signs and symptoms related to disease process for when to call PCP? Yes   Is the patient/caregiver able to teach back signs and symptoms related to disease process for when to call 911? Yes   Is the patient/caregiver able to teach back the hierarchy of who to call/visit for symptoms/problems? PCP, Specialist, Home health nurse, Urgent Care, ED, 911 Yes   If the patient is a current smoker, are they able to teach back resources for cessation? Not a smoker   TCM call completed? Yes   Wrap up additional comments Pt states abdominal is better, and pt report some abdominal discomfort with bloating/cramps. Reviewed meds with pt. Pt needs GI fu appt, and RN  will send a message to PCP office as there are no appts that satisfy TCM .   Call end time 1028   Would this patient benefit from a Referral to [facility]? No   Is the patient interested in additional calls from an ambulatory ? No            Mercedes Read RN    12/14/2023, 10:34 EST